# Patient Record
Sex: FEMALE | Race: WHITE | Employment: FULL TIME | URBAN - METROPOLITAN AREA
[De-identification: names, ages, dates, MRNs, and addresses within clinical notes are randomized per-mention and may not be internally consistent; named-entity substitution may affect disease eponyms.]

---

## 2017-06-19 ENCOUNTER — TELEPHONE (OUTPATIENT)
Dept: FAMILY MEDICINE CLINIC | Age: 32
End: 2017-06-19

## 2017-07-31 ENCOUNTER — OFFICE VISIT (OUTPATIENT)
Dept: FAMILY MEDICINE CLINIC | Age: 32
End: 2017-07-31

## 2017-07-31 VITALS
HEIGHT: 65 IN | SYSTOLIC BLOOD PRESSURE: 106 MMHG | DIASTOLIC BLOOD PRESSURE: 68 MMHG | RESPIRATION RATE: 16 BRPM | HEART RATE: 76 BPM | BODY MASS INDEX: 23.19 KG/M2 | WEIGHT: 139.2 LBS | TEMPERATURE: 98.5 F

## 2017-07-31 DIAGNOSIS — Z00.00 HEALTHCARE MAINTENANCE: Primary | ICD-10-CM

## 2017-07-31 DIAGNOSIS — J45.30 MILD PERSISTENT ASTHMA WITHOUT COMPLICATION: ICD-10-CM

## 2017-07-31 PROBLEM — J30.2 SEASONAL ALLERGIES: Status: ACTIVE | Noted: 2017-07-31

## 2017-07-31 PROCEDURE — 99395 PREV VISIT EST AGE 18-39: CPT | Performed by: FAMILY MEDICINE

## 2017-07-31 RX ORDER — FLUTICASONE PROPIONATE 50 MCG
1 SPRAY, SUSPENSION (ML) NASAL DAILY
COMMUNITY

## 2017-07-31 RX ORDER — LORATADINE 10 MG/1
10 TABLET ORAL DAILY
COMMUNITY

## 2017-07-31 RX ORDER — ALBUTEROL SULFATE 90 UG/1
AEROSOL, METERED RESPIRATORY (INHALATION)
Qty: 1 INHALER | Refills: 5 | Status: SHIPPED | OUTPATIENT
Start: 2017-07-31 | End: 2018-08-16 | Stop reason: SDUPTHER

## 2017-07-31 ASSESSMENT — PATIENT HEALTH QUESTIONNAIRE - PHQ9
1. LITTLE INTEREST OR PLEASURE IN DOING THINGS: 0
2. FEELING DOWN, DEPRESSED OR HOPELESS: 0
SUM OF ALL RESPONSES TO PHQ QUESTIONS 1-9: 0
SUM OF ALL RESPONSES TO PHQ9 QUESTIONS 1 & 2: 0

## 2017-08-05 DIAGNOSIS — Z00.00 HEALTHCARE MAINTENANCE: ICD-10-CM

## 2017-08-05 DIAGNOSIS — Z00.00 HEALTHCARE MAINTENANCE: Primary | ICD-10-CM

## 2017-08-12 DIAGNOSIS — Z00.00 HEALTHCARE MAINTENANCE: ICD-10-CM

## 2017-08-12 LAB
ALBUMIN SERPL-MCNC: 4.3 G/DL (ref 3.9–4.9)
ALP BLD-CCNC: 80 U/L (ref 40–130)
ALT SERPL-CCNC: 18 U/L (ref 0–33)
ANION GAP SERPL CALCULATED.3IONS-SCNC: 15 MEQ/L (ref 7–13)
AST SERPL-CCNC: 15 U/L (ref 0–35)
BASOPHILS ABSOLUTE: 0 K/UL (ref 0–0.2)
BASOPHILS RELATIVE PERCENT: 0.6 %
BILIRUB SERPL-MCNC: 0.3 MG/DL (ref 0–1.2)
BUN BLDV-MCNC: 11 MG/DL (ref 6–20)
CALCIUM SERPL-MCNC: 8.8 MG/DL (ref 8.6–10.2)
CHLORIDE BLD-SCNC: 102 MEQ/L (ref 98–107)
CHOLESTEROL, TOTAL: 138 MG/DL (ref 0–199)
CO2: 25 MEQ/L (ref 22–29)
CREAT SERPL-MCNC: 0.44 MG/DL (ref 0.5–0.9)
EOSINOPHILS ABSOLUTE: 0.1 K/UL (ref 0–0.7)
EOSINOPHILS RELATIVE PERCENT: 2 %
GFR AFRICAN AMERICAN: >60
GFR NON-AFRICAN AMERICAN: >60
GLOBULIN: 2.7 G/DL (ref 2.3–3.5)
GLUCOSE BLD-MCNC: 86 MG/DL (ref 74–109)
HCT VFR BLD CALC: 39.5 % (ref 37–47)
HDLC SERPL-MCNC: 48 MG/DL (ref 40–59)
HEMOGLOBIN: 13.4 G/DL (ref 12–16)
LDL CHOLESTEROL CALCULATED: 80 MG/DL (ref 0–129)
LYMPHOCYTES ABSOLUTE: 1.8 K/UL (ref 1–4.8)
LYMPHOCYTES RELATIVE PERCENT: 28.6 %
MCH RBC QN AUTO: 28.1 PG (ref 27–31.3)
MCHC RBC AUTO-ENTMCNC: 33.9 % (ref 33–37)
MCV RBC AUTO: 83 FL (ref 82–100)
MONOCYTES ABSOLUTE: 0.5 K/UL (ref 0.2–0.8)
MONOCYTES RELATIVE PERCENT: 7.2 %
NEUTROPHILS ABSOLUTE: 3.9 K/UL (ref 1.4–6.5)
NEUTROPHILS RELATIVE PERCENT: 61.6 %
PDW BLD-RTO: 12.9 % (ref 11.5–14.5)
PLATELET # BLD: 193 K/UL (ref 130–400)
POTASSIUM SERPL-SCNC: 4.1 MEQ/L (ref 3.5–5.1)
RBC # BLD: 4.76 M/UL (ref 4.2–5.4)
SODIUM BLD-SCNC: 142 MEQ/L (ref 132–144)
TOTAL PROTEIN: 7 G/DL (ref 6.4–8.1)
TRIGL SERPL-MCNC: 50 MG/DL (ref 0–200)
WBC # BLD: 6.4 K/UL (ref 4.8–10.8)

## 2017-08-14 ENCOUNTER — TELEPHONE (OUTPATIENT)
Dept: FAMILY MEDICINE CLINIC | Age: 32
End: 2017-08-14

## 2017-12-20 ENCOUNTER — OFFICE VISIT (OUTPATIENT)
Dept: FAMILY MEDICINE CLINIC | Age: 32
End: 2017-12-20

## 2017-12-20 VITALS
SYSTOLIC BLOOD PRESSURE: 114 MMHG | HEIGHT: 65 IN | DIASTOLIC BLOOD PRESSURE: 68 MMHG | RESPIRATION RATE: 16 BRPM | BODY MASS INDEX: 22.09 KG/M2 | WEIGHT: 132.6 LBS | HEART RATE: 100 BPM | TEMPERATURE: 97 F

## 2017-12-20 DIAGNOSIS — R68.89 FLU-LIKE SYMPTOMS: ICD-10-CM

## 2017-12-20 DIAGNOSIS — B96.89 ACUTE BACTERIAL SINUSITIS: Primary | ICD-10-CM

## 2017-12-20 DIAGNOSIS — J01.90 ACUTE BACTERIAL SINUSITIS: Primary | ICD-10-CM

## 2017-12-20 LAB
INFLUENZA A ANTIBODY: NORMAL
INFLUENZA B ANTIBODY: NORMAL

## 2017-12-20 PROCEDURE — 87804 INFLUENZA ASSAY W/OPTIC: CPT | Performed by: FAMILY MEDICINE

## 2017-12-20 PROCEDURE — 99213 OFFICE O/P EST LOW 20 MIN: CPT | Performed by: FAMILY MEDICINE

## 2017-12-20 RX ORDER — NORGESTIMATE AND ETHINYL ESTRADIOL
KIT
Refills: 3 | COMMUNITY
Start: 2017-11-25

## 2017-12-20 RX ORDER — CEFUROXIME AXETIL 250 MG/1
250 TABLET ORAL 2 TIMES DAILY
Qty: 20 TABLET | Refills: 0 | Status: SHIPPED | OUTPATIENT
Start: 2017-12-20 | End: 2017-12-30

## 2017-12-20 RX ORDER — FLUTICASONE PROPIONATE 50 MCG
1 SPRAY, SUSPENSION (ML) NASAL DAILY
Qty: 1 BOTTLE | Refills: 0 | Status: CANCELLED | OUTPATIENT
Start: 2017-12-20

## 2017-12-20 NOTE — PROGRESS NOTES
Subjective:       Iram Hopkins is a 27 y.o. female who presents for evaluation of sinus pain. Symptoms include: congestion, cough, facial pain, fevers, headaches, nasal congestion, post nasal drip, sinus pressure, sniffing, and chest discomfort . Onset of symptoms was several days ago. Symptoms have been gradually worsening since that time. Patient is a non-smoker. Patient's medications, allergies, past medical, surgical, social and family histories were reviewed and updated as appropriate. Review of Systems  Constitutional: positive for chills, fatigue, fevers, malaise and sweats  Ears, nose, mouth, throat, and face: as noted above  Respiratory: as noted above  Cardiovascular: negative for chest pain, dyspnea, exertional chest pressure/discomfort, lower extremity edema, near-syncope, orthopnea, palpitations and paroxysmal nocturnal dyspnea  Gastrointestinal: negative for abdominal pain, constipation, diarrhea, nausea, reflux symptoms and vomiting  Integument/breast: negative for dryness, pruritus and rash     Objective     /68   Pulse 100   Temp 97 °F (36.1 °C) (Temporal)   Resp 16   Ht 5' 5\" (1.651 m)   Wt 132 lb 9.6 oz (60.1 kg)   LMP 12/19/2017   BMI 22.07 kg/m²   She appears well, in no apparent distress. Alert and oriented times three, pleasant and cooperative. ENT exam reveals - ENT exam normal, no neck nodes  both TM normal without fluid or infection and throat normal without erythema or exudate. Paranasal sinus exam - tenderness over both maxillary. The neck is supple and free of adenopathy or masses, the thyroid is normal without enlargement or nodules. Chest is clear, no wheezing or rales. Normal symmetric air entry throughout both lung fields. No chest wall deformities or tenderness. S1 and S2 normal, no murmurs, clicks, gallops or rubs. Regular rate and rhythm. The abdomen is soft without tenderness, guarding, mass, rebound or organomegaly.  Bowel sounds are normal. No

## 2017-12-20 NOTE — PATIENT INSTRUCTIONS
hot, wet towel or a warm gel pack on your face 3 or 4 times a day for 5 to 10 minutes each time. · Try a decongestant nasal spray like oxymetazoline (Afrin). Do not use it for more than 3 days in a row. Using it for more than 3 days can make your congestion worse. When should you call for help? Call your doctor now or seek immediate medical care if:  ? · You have new or worse swelling or redness in your face or around your eyes. ? · You have a new or higher fever. ? Watch closely for changes in your health, and be sure to contact your doctor if:  ? · You have new or worse facial pain. ? · The mucus from your nose becomes thicker (like pus) or has new blood in it. ? · You are not getting better as expected. Where can you learn more? Go to https://PlasmonixpepicCodenvyeb.MEDEM. org and sign in to your [employer] account. Enter V164 in the Virtual[employer] Software box to learn more about \"Sinusitis: Care Instructions. \"     If you do not have an account, please click on the \"Sign Up Now\" link. Current as of: May 12, 2017  Content Version: 11.4  © 0085-9589 Healthwise, [employer]. Care instructions adapted under license by [facility] [address] If you have questions about a medical condition or this instruction, always ask your healthcare professional. [address] any warranty or liability for your use of this information.

## 2017-12-20 NOTE — LETTER
Kina [facility] PCP  [address] [address]  Phone: [phone]  Fax: [phone]    Ricardo Amaro MD        December 20, 2017     Patient: Yadira Palacios   YOB: 1985   Date of Visit: 12/20/2017       To Whom it May Concern:    Yadira Palacios was seen in my clinic on 12/20/2017. She may return to work on 12/28/17 off work from 12/18/17 to 12/22/17. If you have any questions or concerns, please don't hesitate to call.     Sincerely,         Ricardo Amaro MD

## 2018-08-13 ENCOUNTER — OFFICE VISIT (OUTPATIENT)
Dept: FAMILY MEDICINE CLINIC | Age: 33
End: 2018-08-13
Payer: COMMERCIAL

## 2018-08-13 VITALS
TEMPERATURE: 98.1 F | BODY MASS INDEX: 22.33 KG/M2 | HEART RATE: 84 BPM | RESPIRATION RATE: 16 BRPM | DIASTOLIC BLOOD PRESSURE: 76 MMHG | SYSTOLIC BLOOD PRESSURE: 110 MMHG | WEIGHT: 134 LBS | HEIGHT: 65 IN

## 2018-08-13 DIAGNOSIS — Z00.00 HEALTH CARE MAINTENANCE: Primary | ICD-10-CM

## 2018-08-13 PROCEDURE — 99395 PREV VISIT EST AGE 18-39: CPT | Performed by: FAMILY MEDICINE

## 2018-08-13 ASSESSMENT — PATIENT HEALTH QUESTIONNAIRE - PHQ9
1. LITTLE INTEREST OR PLEASURE IN DOING THINGS: 0
SUM OF ALL RESPONSES TO PHQ QUESTIONS 1-9: 0
SUM OF ALL RESPONSES TO PHQ QUESTIONS 1-9: 0
2. FEELING DOWN, DEPRESSED OR HOPELESS: 0
SUM OF ALL RESPONSES TO PHQ9 QUESTIONS 1 & 2: 0

## 2018-08-13 NOTE — PROGRESS NOTES
shortness of breath or wheezing  Cardiovascular ROS: no chest pain or dyspnea on exertion  Gastrointestinal ROS: no abdominal pain, change in bowel habits, or black or bloody stools  Genito-Urinary ROS: no dysuria, trouble voiding, or hematuria  Musculoskeletal ROS: negative for - joint pain, joint stiffness, joint swelling, muscle pain or muscular weakness  Neurological ROS: negative for - dizziness, gait disturbance, headaches, impaired coordination/balance, numbness/tingling, tremors or visual changes  Dermatological ROS: negative for - dry skin, lumps, pruritus or rash    Blood pressure 110/76, pulse 84, temperature 98.1 °F (36.7 °C), temperature source Temporal, resp. rate 16, height 5' 5\" (1.651 m), weight 134 lb (60.8 kg), last menstrual period 08/08/2018, currently breastfeeding. Physical Examination: General appearance - alert, well appearing, and in no distress  Mental status - alert, oriented to person, place, and time  Eyes - pupils equal and reactive, extraocular eye movements intact  Ears - bilateral TM's and external ear canals normal  Mouth - mucous membranes moist, pharynx normal without lesions  Neck - supple, no significant adenopathy  Lymphatics - no palpable lymphadenopathy, no hepatosplenomegaly  Chest - clear to auscultation, no wheezes, rales or rhonchi, symmetric air entry  Heart - normal rate, regular rhythm, normal S1, S2, no murmurs, rubs, clicks or gallops  Abdomen - soft, nontender, nondistended, no masses or organomegaly  Neurological - alert, oriented, normal speech, no focal findings or movement disorder noted  Musculoskeletal - no joint tenderness, deformity or swelling  Extremities: peripheral pulses normal, no pedal edema, no clubbing or cyanosis. Diagnosis Orders   1.  Health care maintenance  CBC Auto Differential    Comprehensive Metabolic Panel    Lipid Panel         Orders Placed This Encounter   Procedures    CBC Auto Differential     Standing Status:   Future Standing Expiration Date:   8/13/2019    Comprehensive Metabolic Panel     Standing Status:   Future     Standing Expiration Date:   8/13/2019    Lipid Panel     Standing Status:   Future     Standing Expiration Date:   8/13/2019     Order Specific Question:   Is Patient Fasting?/# of Hours     Answer:   10-12       Outpatient Encounter Prescriptions as of 8/13/2018   Medication Sig Dispense Refill    TRI-PREVIFEM 0.18/0.215/0.25 MG-35 MCG TABS TAKE 1 TABLET BY MOUTH DAILY  3    loratadine (CLARITIN) 10 MG tablet Take 10 mg by mouth daily      albuterol sulfate HFA (PROAIR HFA) 108 (90 Base) MCG/ACT inhaler 1-2 puffs every 4-6 hrs as needed for cough/wheezing 1 Inhaler 5    fluticasone (FLONASE) 50 MCG/ACT nasal spray 1 spray by Nasal route daily       No facility-administered encounter medications on file as of 8/13/2018. The nature of cardiac risk has been fully discussed with this patient. I have made her aware of her LDL target goal given her cardiovascular risk analysis. I have discussed the appropriate diet. The need for lifelong compliance in order to reduce risk is stressed. A regular exercise program is recommended to help achieve and maintain normal body weight, fitness and improve lipid balance. A written copy of a low fat, low cholesterol diet has been given to the patient. Patient education provided. They understand and agree with this course of treatment. They will return with new or worsening symptoms. Patient instructed to remain current with appropriate annual health maintenance.         8

## 2018-08-13 NOTE — PATIENT INSTRUCTIONS
exposed skin. · See a dentist one or two times a year for checkups and to have your teeth cleaned. · Wear a seat belt in the car. · Drink alcohol in moderation, if at all. That means no more than 2 drinks a day for men and 1 drink a day for women. Follow your doctor's advice about when to have certain tests. These tests can spot problems early. For everyone  · Cholesterol. Have the fat (cholesterol) in your blood tested after age 21. Your doctor will tell you how often to have this done based on your age, family history, or other things that can increase your risk for heart disease. · Blood pressure. Have your blood pressure checked during a routine doctor visit. Your doctor will tell you how often to check your blood pressure based on your age, your blood pressure results, and other factors. · Vision. Talk with your doctor about how often to have a glaucoma test.  · Diabetes. Ask your doctor whether you should have tests for diabetes. · Colon cancer. Have a test for colon cancer at age 48. You may have one of several tests. If you are younger than 48, you may need a test earlier if you have any risk factors. Risk factors include whether you already had a precancerous polyp removed from your colon or whether your parent, brother, sister, or child has had colon cancer. For women  · Breast exam and mammogram. Talk to your doctor about when you should have a clinical breast exam and a mammogram. Medical experts differ on whether and how often women under 50 should have these tests. Your doctor can help you decide what is right for you. · Pap test and pelvic exam. Begin Pap tests at age 24. A Pap test is the best way to find cervical cancer. The test often is part of a pelvic exam. Ask how often to have this test.  · Tests for sexually transmitted infections (STIs). Ask whether you should have tests for STIs.  You may be at risk if you have sex with more than one person, especially if your partners do not wear condoms. For men  · Tests for sexually transmitted infections (STIs). Ask whether you should have tests for STIs. You may be at risk if you have sex with more than one person, especially if you do not wear a condom. · Testicular cancer exam. Ask your doctor whether you should check your testicles regularly. · Prostate exam. Talk to your doctor about whether you should have a blood test (called a PSA test) for prostate cancer. Experts differ on whether and when men should have this test. Some experts suggest it if you are older than 39 and are -American or have a father or brother who got prostate cancer when he was younger than 72. When should you call for help? Watch closely for changes in your health, and be sure to contact your doctor if you have any problems or symptoms that concern you. Where can you learn more? Go to https://AudioMicropearnaldoeb.healthTPI Composites. org and sign in to your [employer] account. Enter P072 in the tribr box to learn more about \"Well Visit, Ages 25 to 48: Care Instructions. \"     If you do not have an account, please click on the \"Sign Up Now\" link. Current as of: May 16, 2017  Content Version: 11.7  © 1893-6538 [employer]. Care instructions adapted under license by [facility] ([facility]). If you have questions about a medical condition or this instruction, always ask your healthcare professional. [address]  any warranty or liability for your use of this information.

## 2018-08-18 DIAGNOSIS — Z00.00 HEALTH CARE MAINTENANCE: ICD-10-CM

## 2018-08-18 LAB
ALBUMIN SERPL-MCNC: 4.3 G/DL (ref 3.9–4.9)
ALP BLD-CCNC: 59 U/L (ref 40–130)
ALT SERPL-CCNC: 14 U/L (ref 0–33)
ANION GAP SERPL CALCULATED.3IONS-SCNC: 16 MEQ/L (ref 7–13)
AST SERPL-CCNC: 16 U/L (ref 0–35)
BASOPHILS ABSOLUTE: 0 K/UL (ref 0–0.2)
BASOPHILS RELATIVE PERCENT: 0.6 %
BILIRUB SERPL-MCNC: 0.3 MG/DL (ref 0–1.2)
BUN BLDV-MCNC: 12 MG/DL (ref 6–20)
CALCIUM SERPL-MCNC: 8.7 MG/DL (ref 8.6–10.2)
CHLORIDE BLD-SCNC: 102 MEQ/L (ref 98–107)
CHOLESTEROL, TOTAL: 177 MG/DL (ref 0–199)
CO2: 22 MEQ/L (ref 22–29)
CREAT SERPL-MCNC: 0.5 MG/DL (ref 0.5–0.9)
EOSINOPHILS ABSOLUTE: 0.1 K/UL (ref 0–0.7)
EOSINOPHILS RELATIVE PERCENT: 2.1 %
GFR AFRICAN AMERICAN: >60
GFR NON-AFRICAN AMERICAN: >60
GLOBULIN: 2.8 G/DL (ref 2.3–3.5)
GLUCOSE BLD-MCNC: 72 MG/DL (ref 74–109)
HCT VFR BLD CALC: 39.7 % (ref 37–47)
HDLC SERPL-MCNC: 61 MG/DL (ref 40–59)
HEMOGLOBIN: 13.8 G/DL (ref 12–16)
LDL CHOLESTEROL CALCULATED: 101 MG/DL (ref 0–129)
LYMPHOCYTES ABSOLUTE: 2 K/UL (ref 1–4.8)
LYMPHOCYTES RELATIVE PERCENT: 30 %
MCH RBC QN AUTO: 30.1 PG (ref 27–31.3)
MCHC RBC AUTO-ENTMCNC: 34.9 % (ref 33–37)
MCV RBC AUTO: 86.3 FL (ref 82–100)
MONOCYTES ABSOLUTE: 0.4 K/UL (ref 0.2–0.8)
MONOCYTES RELATIVE PERCENT: 5.6 %
NEUTROPHILS ABSOLUTE: 4 K/UL (ref 1.4–6.5)
NEUTROPHILS RELATIVE PERCENT: 61.7 %
PDW BLD-RTO: 12.4 % (ref 11.5–14.5)
PLATELET # BLD: 209 K/UL (ref 130–400)
POTASSIUM SERPL-SCNC: 3.9 MEQ/L (ref 3.5–5.1)
RBC # BLD: 4.6 M/UL (ref 4.2–5.4)
SODIUM BLD-SCNC: 140 MEQ/L (ref 132–144)
TOTAL PROTEIN: 7.1 G/DL (ref 6.4–8.1)
TRIGL SERPL-MCNC: 75 MG/DL (ref 0–200)
WBC # BLD: 6.6 K/UL (ref 4.8–10.8)

## 2018-08-21 NOTE — PROGRESS NOTES
Please call Luisa Palomino to notify her that test results are normal including cholesterol, blood glucose, kidney and liver functions and blood count. Health form completed. Scan, fax and give original to patient.

## 2019-03-14 ENCOUNTER — OFFICE VISIT (OUTPATIENT)
Dept: FAMILY MEDICINE CLINIC | Age: 34
End: 2019-03-14
Payer: COMMERCIAL

## 2019-03-14 VITALS
WEIGHT: 168 LBS | SYSTOLIC BLOOD PRESSURE: 108 MMHG | TEMPERATURE: 97.8 F | DIASTOLIC BLOOD PRESSURE: 68 MMHG | OXYGEN SATURATION: 100 % | RESPIRATION RATE: 14 BRPM | HEART RATE: 70 BPM | BODY MASS INDEX: 27.99 KG/M2 | HEIGHT: 65 IN

## 2019-03-14 DIAGNOSIS — R07.89 CHEST TIGHTNESS: ICD-10-CM

## 2019-03-14 DIAGNOSIS — J06.9 UPPER RESPIRATORY TRACT INFECTION, UNSPECIFIED TYPE: Primary | ICD-10-CM

## 2019-03-14 DIAGNOSIS — R05.9 COUGH: ICD-10-CM

## 2019-03-14 PROCEDURE — 99213 OFFICE O/P EST LOW 20 MIN: CPT | Performed by: NURSE PRACTITIONER

## 2019-03-14 RX ORDER — METHYLPREDNISOLONE 4 MG/1
TABLET ORAL
Qty: 1 KIT | Refills: 0 | Status: SHIPPED | OUTPATIENT
Start: 2019-03-14 | End: 2019-03-20

## 2019-03-14 RX ORDER — IPRATROPIUM BROMIDE 21 UG/1
2 SPRAY, METERED NASAL 3 TIMES DAILY
Qty: 1 BOTTLE | Refills: 3 | Status: SHIPPED | OUTPATIENT
Start: 2019-03-14 | End: 2020-03-13

## 2019-03-14 ASSESSMENT — ENCOUNTER SYMPTOMS
SORE THROAT: 1
SINUS PAIN: 0
SINUS PRESSURE: 0
COUGH: 1
RHINORRHEA: 1
WHEEZING: 0
CHEST TIGHTNESS: 1
GASTROINTESTINAL NEGATIVE: 1

## 2019-03-25 ASSESSMENT — ENCOUNTER SYMPTOMS: EYES NEGATIVE: 1

## 2023-06-03 DIAGNOSIS — R06.2 WHEEZING: ICD-10-CM

## 2023-06-06 DIAGNOSIS — G43.019 MIGRAINE WITHOUT AURA, INTRACTABLE, WITHOUT STATUS MIGRAINOSUS: ICD-10-CM

## 2023-06-07 RX ORDER — SUMATRIPTAN SUCCINATE 100 MG/1
TABLET ORAL
Qty: 10 TABLET | Refills: 2 | Status: SHIPPED | OUTPATIENT
Start: 2023-06-07 | End: 2023-11-04

## 2023-06-12 RX ORDER — ALBUTEROL SULFATE 90 UG/1
AEROSOL, METERED RESPIRATORY (INHALATION)
Qty: 8.5 G | Refills: 2 | Status: SHIPPED | OUTPATIENT
Start: 2023-06-12

## 2023-06-12 NOTE — TELEPHONE ENCOUNTER
Patient is scheduled for 8/3/23  Patient stated that she would like to just do her annual physical and medication refills all at once. She is currently out of town and is not in desperate need of medication   Please advise

## 2023-07-26 PROBLEM — J45.20 MILD INTERMITTENT ASTHMA WITHOUT COMPLICATION (HHS-HCC): Status: ACTIVE | Noted: 2023-07-26

## 2023-07-26 PROBLEM — M25.50 POLYARTHRALGIA: Status: ACTIVE | Noted: 2023-07-26

## 2023-07-26 PROBLEM — M10.072 ACUTE IDIOPATHIC GOUT OF LEFT FOOT: Status: ACTIVE | Noted: 2023-07-26

## 2023-07-26 PROBLEM — G43.019 INTRACTABLE MIGRAINE WITHOUT AURA AND WITHOUT STATUS MIGRAINOSUS: Status: ACTIVE | Noted: 2023-07-26

## 2023-07-26 RX ORDER — NORGESTIMATE AND ETHINYL ESTRADIOL 0.25-0.035
1 KIT ORAL DAILY
COMMUNITY
Start: 2019-08-07 | End: 2023-11-09 | Stop reason: WASHOUT

## 2023-07-26 RX ORDER — MULTIVITAMIN
1 TABLET ORAL DAILY
COMMUNITY

## 2023-07-26 RX ORDER — LORATADINE 10 MG/1
10 TABLET ORAL DAILY
COMMUNITY

## 2023-07-26 RX ORDER — VIT C/ZN GLUC/HERBAL NO.325 90 MG-15MG
1 LOZENGE MUCOUS MEMBRANE DAILY
COMMUNITY

## 2023-08-03 ENCOUNTER — LAB (OUTPATIENT)
Dept: LAB | Facility: LAB | Age: 38
End: 2023-08-03
Payer: COMMERCIAL

## 2023-08-03 ENCOUNTER — OFFICE VISIT (OUTPATIENT)
Dept: PRIMARY CARE | Facility: CLINIC | Age: 38
End: 2023-08-03
Payer: COMMERCIAL

## 2023-08-03 VITALS
SYSTOLIC BLOOD PRESSURE: 102 MMHG | OXYGEN SATURATION: 99 % | TEMPERATURE: 97.6 F | DIASTOLIC BLOOD PRESSURE: 60 MMHG | WEIGHT: 136.4 LBS | RESPIRATION RATE: 17 BRPM | HEIGHT: 65 IN | BODY MASS INDEX: 22.73 KG/M2 | HEART RATE: 71 BPM

## 2023-08-03 DIAGNOSIS — J45.20 MILD INTERMITTENT ASTHMA WITHOUT COMPLICATION (HHS-HCC): ICD-10-CM

## 2023-08-03 DIAGNOSIS — G43.019 INTRACTABLE MIGRAINE WITHOUT AURA AND WITHOUT STATUS MIGRAINOSUS: ICD-10-CM

## 2023-08-03 DIAGNOSIS — Z00.00 HEALTHCARE MAINTENANCE: Primary | ICD-10-CM

## 2023-08-03 DIAGNOSIS — Z00.00 HEALTHCARE MAINTENANCE: ICD-10-CM

## 2023-08-03 LAB
ALANINE AMINOTRANSFERASE (SGPT) (U/L) IN SER/PLAS: 14 U/L (ref 7–45)
ALBUMIN (G/DL) IN SER/PLAS: 4.3 G/DL (ref 3.4–5)
ALKALINE PHOSPHATASE (U/L) IN SER/PLAS: 51 U/L (ref 33–110)
ANION GAP IN SER/PLAS: 14 MMOL/L (ref 10–20)
ASPARTATE AMINOTRANSFERASE (SGOT) (U/L) IN SER/PLAS: 13 U/L (ref 9–39)
BASOPHILS (10*3/UL) IN BLOOD BY AUTOMATED COUNT: 0.06 X10E9/L (ref 0–0.1)
BASOPHILS/100 LEUKOCYTES IN BLOOD BY AUTOMATED COUNT: 1 % (ref 0–2)
BILIRUBIN TOTAL (MG/DL) IN SER/PLAS: 0.5 MG/DL (ref 0–1.2)
CALCIUM (MG/DL) IN SER/PLAS: 9.3 MG/DL (ref 8.6–10.3)
CARBON DIOXIDE, TOTAL (MMOL/L) IN SER/PLAS: 26 MMOL/L (ref 21–32)
CHLORIDE (MMOL/L) IN SER/PLAS: 104 MMOL/L (ref 98–107)
CHOLESTEROL (MG/DL) IN SER/PLAS: 193 MG/DL (ref 0–199)
CHOLESTEROL IN HDL (MG/DL) IN SER/PLAS: 74.4 MG/DL
CHOLESTEROL/HDL RATIO: 2.6
CREATININE (MG/DL) IN SER/PLAS: 0.8 MG/DL (ref 0.5–1.05)
EOSINOPHILS (10*3/UL) IN BLOOD BY AUTOMATED COUNT: 0.2 X10E9/L (ref 0–0.7)
EOSINOPHILS/100 LEUKOCYTES IN BLOOD BY AUTOMATED COUNT: 3.3 % (ref 0–6)
ERYTHROCYTE DISTRIBUTION WIDTH (RATIO) BY AUTOMATED COUNT: 11.9 % (ref 11.5–14.5)
ERYTHROCYTE MEAN CORPUSCULAR HEMOGLOBIN CONCENTRATION (G/DL) BY AUTOMATED: 34.1 G/DL (ref 32–36)
ERYTHROCYTE MEAN CORPUSCULAR VOLUME (FL) BY AUTOMATED COUNT: 86 FL (ref 80–100)
ERYTHROCYTES (10*6/UL) IN BLOOD BY AUTOMATED COUNT: 4.66 X10E12/L (ref 4–5.2)
GFR FEMALE: >90 ML/MIN/1.73M2
GLUCOSE (MG/DL) IN SER/PLAS: 85 MG/DL (ref 74–99)
HEMATOCRIT (%) IN BLOOD BY AUTOMATED COUNT: 40.2 % (ref 36–46)
HEMOGLOBIN (G/DL) IN BLOOD: 13.7 G/DL (ref 12–16)
IMMATURE GRANULOCYTES/100 LEUKOCYTES IN BLOOD BY AUTOMATED COUNT: 0.3 % (ref 0–0.9)
LDL: 101 MG/DL (ref 0–99)
LEUKOCYTES (10*3/UL) IN BLOOD BY AUTOMATED COUNT: 6 X10E9/L (ref 4.4–11.3)
LYMPHOCYTES (10*3/UL) IN BLOOD BY AUTOMATED COUNT: 2.06 X10E9/L (ref 1.2–4.8)
LYMPHOCYTES/100 LEUKOCYTES IN BLOOD BY AUTOMATED COUNT: 34.4 % (ref 13–44)
MONOCYTES (10*3/UL) IN BLOOD BY AUTOMATED COUNT: 0.34 X10E9/L (ref 0.1–1)
MONOCYTES/100 LEUKOCYTES IN BLOOD BY AUTOMATED COUNT: 5.7 % (ref 2–10)
NEUTROPHILS (10*3/UL) IN BLOOD BY AUTOMATED COUNT: 3.31 X10E9/L (ref 1.2–7.7)
NEUTROPHILS/100 LEUKOCYTES IN BLOOD BY AUTOMATED COUNT: 55.3 % (ref 40–80)
PLATELETS (10*3/UL) IN BLOOD AUTOMATED COUNT: 222 X10E9/L (ref 150–450)
POTASSIUM (MMOL/L) IN SER/PLAS: 4.1 MMOL/L (ref 3.5–5.3)
PROTEIN TOTAL: 7.4 G/DL (ref 6.4–8.2)
SODIUM (MMOL/L) IN SER/PLAS: 140 MMOL/L (ref 136–145)
TRIGLYCERIDE (MG/DL) IN SER/PLAS: 87 MG/DL (ref 0–149)
UREA NITROGEN (MG/DL) IN SER/PLAS: 14 MG/DL (ref 6–23)
VLDL: 17 MG/DL (ref 0–40)

## 2023-08-03 PROCEDURE — 1036F TOBACCO NON-USER: CPT | Performed by: FAMILY MEDICINE

## 2023-08-03 PROCEDURE — 36415 COLL VENOUS BLD VENIPUNCTURE: CPT

## 2023-08-03 PROCEDURE — 85025 COMPLETE CBC W/AUTO DIFF WBC: CPT

## 2023-08-03 PROCEDURE — 80061 LIPID PANEL: CPT

## 2023-08-03 PROCEDURE — 99396 PREV VISIT EST AGE 40-64: CPT | Performed by: FAMILY MEDICINE

## 2023-08-03 PROCEDURE — 80053 COMPREHEN METABOLIC PANEL: CPT

## 2023-08-03 RX ORDER — TOPIRAMATE 50 MG/1
50 TABLET, FILM COATED ORAL 2 TIMES DAILY
Qty: 60 TABLET | Refills: 2 | Status: SHIPPED | OUTPATIENT
Start: 2023-08-03 | End: 2023-11-09 | Stop reason: SDUPTHER

## 2023-08-03 RX ORDER — DROSPIRENONE AND ETHINYL ESTRADIOL 0.03MG-3MG
1 KIT ORAL DAILY
COMMUNITY

## 2023-08-03 RX ORDER — TOPIRAMATE 25 MG/1
TABLET ORAL
Qty: 70 TABLET | Refills: 0 | Status: SHIPPED | OUTPATIENT
Start: 2023-08-03 | End: 2023-11-09 | Stop reason: DRUGHIGH

## 2023-08-03 ASSESSMENT — PATIENT HEALTH QUESTIONNAIRE - PHQ9
SUM OF ALL RESPONSES TO PHQ9 QUESTIONS 1 AND 2: 0
1. LITTLE INTEREST OR PLEASURE IN DOING THINGS: NOT AT ALL
2. FEELING DOWN, DEPRESSED OR HOPELESS: NOT AT ALL

## 2023-08-03 NOTE — PROGRESS NOTES
Chief Complaint   Patient presents with    Annual Exam      She presents for annual physical exam and other chronic medical conditions.    Patient has a waist measurement of 78 cm.    Headache  Patient presents for evaluation of headache. Generally, the headaches last about several hours and occur monthly. The headaches  typically occur around menstrual cycle . The headaches are usually pounding, sharp, and throbbing. Recently, the headaches have been increasing in frequency. Work attendance or other daily activities are not affected by the headaches. Precipitating factors include: menses. The headaches are usually not preceded by an aura. The patient denies depression, dizziness, loss of balance, numbness of extremities, speech difficulties, and vision problems. Home treatment has included Imitrex oral with some improvement.    Patient Active Problem List   Diagnosis    Intractable migraine without aura and without status migrainosus    Mild intermittent asthma without complication    Polyarthralgia    Acute idiopathic gout of left foot    Healthcare maintenance     has a current medication list which includes the following prescription(s): albuterol, drospirenone-ethinyl estradiol, loratadine, multivitamin with folic acid, sumatriptan, elderberry zinc vit c, and norgestimate-ethinyl estradiol.    History reviewed. No pertinent past medical history.    Past Surgical History:   Procedure Laterality Date    OTHER SURGICAL HISTORY  08/15/2019    [place] tooth extraction     family history is not on file.    No Known Allergies    Review of Systems -   General ROS: negative for - fatigue, fever, malaise, night sweats or weight loss  Eyes ROS no blurred vision, no double vision, no eye discharge and no eye redness.  ENT ROS: negative for - hearing change, nasal discharge, oral lesions, sinus pain, sore throat, tinnitus or vertigo  Respiratory ROS: negative for - cough, hemoptysis, pleuritic pain, shortness of breath or  "wheezing  Cardiovascular ROS: no chest pain or dyspnea on exertion, palpitations, PND or orthopnea.  No syncope.  Gastrointestinal ROS: no abdominal pain, change in bowel habits, or black or bloody stools  Genito-Urinary ROS: no dysuria, trouble voiding, or hematuria  Dermatological ROS: negative for - dry skin, lumps, pruritus or rash  Musculoskeletal ROS: negative for - joint pain, joint stiffness, joint swelling, muscle pain or muscular weakness  Psychological ROS: negative for - anxiety, concentration difficulties, depression, memory difficulties or sleep disturbances  Endocrine ROS: negative for - hot flashes, malaise/lethargy, palpitations, polydipsia/polyuria, skin changes, temperature intolerance   Hematological and Lymphatic ROS: negative for - bruising, night sweats or pallor    Blood pressure 102/60, pulse 71, temperature 36.4 °C (97.6 °F), resp. rate 17, height 1.651 m (5' 5\"), weight 61.9 kg (136 lb 6.4 oz), SpO2 99 %.    Physical Examination:   General appearance - alert, well appearing, and in no distress  HEENT EOMI, PEERLA, normal eyelids.  Oropharynx no erythema or exudate.  Normal tonsils.    Ears -external auditory canal normal bilaterally.  Tympanic membranes normal bilaterally.  Mouth - mucous membranes moist, pharynx normal without lesions  Neck - supple, trachea central no thyromegaly.  No significant adenopathy  Chest -good air entry bilaterally, no rhonchi rales and no wheezes.  Heart -Normal S1 and S2, regular rate and rhythm with no murmurs gallop or rub.  Abdomen -Non distended, soft, nontender with no guarding, no palpable mass and no CVA tenderness.  Bowel sounds normal.  No hernia.  Skin no rash, nonicteric and warm to touch.  Neurological - alert, oriented, cranial nerves II through XII normal.  Reflexes 2+ bilaterally.  No focal deficit.  Musculoskeletal - no joint tenderness, deformity or swelling  Extremities: peripheral pulses normal, no clubbing or cyanosis.    Problem List " Items Addressed This Visit       Intractable migraine without aura and without status migrainosus    Current Assessment & Plan       Lie in darkened room and apply cold packs as needed for pain.  Side effect profile discussed in detail.  Asked to keep headache diary.  Patient reassured that neurodiagnostic workup not indicated from benign H&P.  Will start on Topamax 25mg increasing dose up to 50mg twice daily and recheck in 3 months to see if this plan is alleviating the migraines.            Mild intermittent asthma without complication    Current Assessment & Plan     Asthma Plan:  Discussed distinction between quick-relief and controlled medications.  Warning signs of respiratory distress were reviewed with the patient.   Discussed avoidance of precipitants.  Discussed pathophysiology of asthma.  Patient to keep asthma diary.          Healthcare maintenance - Primary    Relevant Orders    CBC and Auto Differential    Comprehensive metabolic panel    Lipid panel      Outpatient Encounter Medications as of 8/3/2023   Medication Sig Dispense Refill    albuterol 90 mcg/actuation inhaler INHALE 1 (ONE) to 2 (TWO) puffs BY MOUTH EVERY 4 TO 6 HOURS AS NEEDED for cough or wheezing 8.5 g 2    drospirenone-ethinyl estradioL (Rita, Ocella) 3-0.03 mg tablet Take 1 tablet by mouth once daily.      loratadine (Claritin) 10 mg tablet Take 1 tablet (10 mg) by mouth once daily.      multivitamin with folic acid (One Daily Multivitamin) 400 mcg tablet Take 1 tablet by mouth once daily.      SUMAtriptan (Imitrex) 100 mg tablet TAKE 1 TABLET BY MOUTH AT ONSET OF MIGRAINE HEADACHE. MAY REPEAT IN 2 HOURS AS NEEDED 10 tablet 2    vit C-Zn gluc-herbal no.325 (Elderberry Zinc Vit C) 90-15 mg lozenge Take 1 lozenge by mouth once daily.      norgestimate-ethinyl estradioL (Mono-Linyah) 0.25-35 mg-mcg tablet Take 1 tablet by mouth once daily.       No facility-administered encounter medications on file as of 8/3/2023.     The nature of  cardiac risk has been fully discussed with this patient. I have made  aware of  LDL target goal given  cardiovascular risk analysis. I have discussed the appropriate diet. The need for lifelong compliance in order to reduce risk is stressed. A regular exercise program is recommended to help achieve and maintain normal body weight, fitness and improve lipid balance. A written copy of a low fat, low cholesterol diet has been given to the patient.    Patient education provided. They understand and agree with this course of treatment. They will return with new or worsening symptoms. Patient instructed to remain current with appropriate annual health maintenance.     Scribe Attestation  By signing my name below, IMayela Scribe   attest that this documentation has been prepared under the direction and in the presence of Denis Man MD.

## 2023-08-03 NOTE — ASSESSMENT & PLAN NOTE
Asthma Plan:  Discussed distinction between quick-relief and controlled medications.  Warning signs of respiratory distress were reviewed with the patient.   Discussed avoidance of precipitants.  Discussed pathophysiology of asthma.  Patient to keep asthma diary.

## 2023-08-03 NOTE — ASSESSMENT & PLAN NOTE
Lie in darkened room and apply cold packs as needed for pain.  Side effect profile discussed in detail.  Asked to keep headache diary.  Patient reassured that neurodiagnostic workup not indicated from benign H&P.  Will start on Topamax 25mg increasing dose up to 50mg twice daily and recheck in 3 months to see if this plan is alleviating the migraines.

## 2023-09-08 DIAGNOSIS — G43.019 INTRACTABLE MIGRAINE WITHOUT AURA AND WITHOUT STATUS MIGRAINOSUS: ICD-10-CM

## 2023-09-08 RX ORDER — TOPIRAMATE 50 MG/1
50 TABLET, FILM COATED ORAL 2 TIMES DAILY
Qty: 60 TABLET | Refills: 2 | OUTPATIENT
Start: 2023-09-08

## 2023-09-08 NOTE — TELEPHONE ENCOUNTER
Duplicate -- Deny request     Rx Refill Request --- Last Rx 23 with 2 refills     Name: Shahida Mason  :  1985     Specific Pharmacy location:  [facility]   Date of last appointment:  8/3/2023   Date of next appointment:  2023   Best number to reach patient:  [phone]

## 2023-09-22 ENCOUNTER — APPOINTMENT (OUTPATIENT)
Dept: PRIMARY CARE | Facility: CLINIC | Age: 38
End: 2023-09-22
Payer: COMMERCIAL

## 2023-11-04 DIAGNOSIS — G43.019 MIGRAINE WITHOUT AURA, INTRACTABLE, WITHOUT STATUS MIGRAINOSUS: ICD-10-CM

## 2023-11-04 RX ORDER — SUMATRIPTAN SUCCINATE 100 MG/1
TABLET ORAL
Qty: 10 TABLET | Refills: 2 | Status: SHIPPED | OUTPATIENT
Start: 2023-11-04 | End: 2023-11-09 | Stop reason: SDUPTHER

## 2023-11-09 ENCOUNTER — OFFICE VISIT (OUTPATIENT)
Dept: PRIMARY CARE | Facility: CLINIC | Age: 38
End: 2023-11-09
Payer: COMMERCIAL

## 2023-11-09 VITALS
DIASTOLIC BLOOD PRESSURE: 58 MMHG | HEART RATE: 75 BPM | BODY MASS INDEX: 21.86 KG/M2 | SYSTOLIC BLOOD PRESSURE: 100 MMHG | RESPIRATION RATE: 17 BRPM | OXYGEN SATURATION: 100 % | HEIGHT: 65 IN | TEMPERATURE: 97.4 F | WEIGHT: 131.2 LBS

## 2023-11-09 DIAGNOSIS — G43.019 INTRACTABLE MIGRAINE WITHOUT AURA AND WITHOUT STATUS MIGRAINOSUS: Primary | ICD-10-CM

## 2023-11-09 PROBLEM — J30.9 CHRONIC ALLERGIC RHINITIS: Status: ACTIVE | Noted: 2023-11-09

## 2023-11-09 PROCEDURE — 1036F TOBACCO NON-USER: CPT | Performed by: FAMILY MEDICINE

## 2023-11-09 PROCEDURE — 99213 OFFICE O/P EST LOW 20 MIN: CPT | Performed by: FAMILY MEDICINE

## 2023-11-09 RX ORDER — SUMATRIPTAN SUCCINATE 100 MG/1
TABLET ORAL
Qty: 10 TABLET | Refills: 3 | Status: SHIPPED | OUTPATIENT
Start: 2023-11-09 | End: 2024-02-29 | Stop reason: SDUPTHER

## 2023-11-09 RX ORDER — TOPIRAMATE 50 MG/1
TABLET, FILM COATED ORAL
Qty: 75 TABLET | Refills: 3 | Status: SHIPPED | OUTPATIENT
Start: 2023-11-09 | End: 2024-02-29 | Stop reason: SDUPTHER

## 2023-11-09 ASSESSMENT — ENCOUNTER SYMPTOMS
COUGH: 0
TREMORS: 0
HEMATURIA: 0
VOMITING: 0
PALPITATIONS: 0
CONSTIPATION: 0
SPEECH DIFFICULTY: 0
WHEEZING: 0
SHORTNESS OF BREATH: 0
HEADACHES: 0
FEVER: 0
DYSURIA: 0
WEAKNESS: 0
LOSS OF BALANCE: 0
SORE THROAT: 0
CHILLS: 0
NAUSEA: 1
EYE PAIN: 0
NECK PAIN: 0
NUMBNESS: 0
DIARRHEA: 0
ABDOMINAL PAIN: 0
DIZZINESS: 0
FREQUENCY: 0
SCALP TENDERNESS: 0
RHINORRHEA: 0

## 2023-11-09 NOTE — PROGRESS NOTES
"Subjective   Patient ID: Shahida Mason is a 38 y.o. female who presents for Follow-up (Migraines ).    Migraine   This is a chronic problem. The current episode started more than 1 year ago. The problem occurs monthly. The problem has been unchanged. The quality of the pain is described as squeezing, shooting and throbbing. The pain is at a severity of 8/10. The pain is severe. Associated symptoms include nausea. Pertinent negatives include no abdominal pain, coughing, dizziness, ear pain, eye pain, fever, hearing loss, loss of balance, neck pain, numbness, rhinorrhea, scalp tenderness, sore throat, vomiting or weakness. The symptoms are aggravated by menstrual cycle.        Review of Systems   Constitutional:  Negative for chills and fever.   HENT:  Negative for congestion, ear pain, hearing loss, nosebleeds, rhinorrhea and sore throat.    Eyes:  Negative for pain.   Respiratory:  Negative for cough, shortness of breath and wheezing.    Cardiovascular:  Negative for chest pain, palpitations and leg swelling.   Gastrointestinal:  Positive for nausea. Negative for abdominal pain, constipation, diarrhea and vomiting.   Genitourinary:  Negative for dysuria, frequency and hematuria.   Musculoskeletal:  Negative for neck pain.   Neurological:  Negative for dizziness, tremors, speech difficulty, weakness, numbness, headaches and loss of balance.       Objective   /58 (BP Location: Left arm)   Pulse 75   Temp 36.3 °C (97.4 °F)   Resp 17   Ht 1.651 m (5' 5\")   Wt 59.5 kg (131 lb 3.2 oz)   SpO2 100%   BMI 21.83 kg/m²     Physical Exam  Constitutional:       General: She is not in acute distress.     Appearance: Normal appearance.   HENT:      Head: Normocephalic and atraumatic.      Mouth/Throat:      Mouth: Mucous membranes are moist.      Pharynx: Oropharynx is clear. No oropharyngeal exudate or posterior oropharyngeal erythema.   Eyes:      General: No scleral icterus.     Extraocular Movements: " Extraocular movements intact.      Pupils: Pupils are equal, round, and reactive to light.   Cardiovascular:      Rate and Rhythm: Normal rate and regular rhythm.      Pulses: Normal pulses.      Heart sounds: No murmur heard.     No friction rub. No gallop.   Pulmonary:      Effort: Pulmonary effort is normal.      Breath sounds: No wheezing, rhonchi or rales.   Skin:     General: Skin is warm.      Coloration: Skin is not jaundiced or pale.   Neurological:      General: No focal deficit present.      Mental Status: She is alert and oriented to person, place, and time.         Assessment/Plan   Problem List Items Addressed This Visit       Intractable migraine without aura and without status migrainosus - Primary     We discussed that the hormones from birth control pills combined with the normal hormones can make migraines worse. We discussed other treatment options including monthly injections. The risks and benefits of my recommendations were discussed with the patient today. We will increase the Topamax to 1.5 tablets at night and continue with 1 tablet in the morning and see if the dose adjustment helps before making any additional changes.            Relevant Medications    topiramate (Topamax) 50 mg tablet    SUMAtriptan (Imitrex) 100 mg tablet    Other Relevant Orders    Follow Up In [facility] - PCP - Established     Scribe Attestation  By signing my name below, IChas Scribe   attest that this documentation has been prepared under the direction and in the presence of Denis Man MD.

## 2023-11-09 NOTE — ASSESSMENT & PLAN NOTE
We discussed that the hormones from birth control pills combined with the normal hormones can make migraines worse. We discussed other treatment options including monthly injections. The risks and benefits of my recommendations were discussed with the patient today. We will increase the Topamax to 1.5 tablets at night and continue with 1 tablet in the morning and see if the dose adjustment helps before making any additional changes.

## 2023-12-08 DIAGNOSIS — G43.019 INTRACTABLE MIGRAINE WITHOUT AURA AND WITHOUT STATUS MIGRAINOSUS: ICD-10-CM

## 2023-12-09 RX ORDER — TOPIRAMATE 50 MG/1
TABLET, FILM COATED ORAL
Qty: 75 TABLET | Refills: 3 | OUTPATIENT
Start: 2023-12-09

## 2024-02-02 ENCOUNTER — APPOINTMENT (OUTPATIENT)
Dept: PRIMARY CARE | Facility: CLINIC | Age: 39
End: 2024-02-02
Payer: COMMERCIAL

## 2024-02-12 ENCOUNTER — APPOINTMENT (OUTPATIENT)
Dept: PRIMARY CARE | Facility: CLINIC | Age: 39
End: 2024-02-12
Payer: COMMERCIAL

## 2024-02-29 ENCOUNTER — OFFICE VISIT (OUTPATIENT)
Dept: PRIMARY CARE | Facility: CLINIC | Age: 39
End: 2024-02-29
Payer: COMMERCIAL

## 2024-02-29 VITALS
BODY MASS INDEX: 21.99 KG/M2 | DIASTOLIC BLOOD PRESSURE: 60 MMHG | HEIGHT: 65 IN | RESPIRATION RATE: 16 BRPM | HEART RATE: 72 BPM | SYSTOLIC BLOOD PRESSURE: 104 MMHG | WEIGHT: 132 LBS | TEMPERATURE: 97.4 F

## 2024-02-29 DIAGNOSIS — J45.20 MILD INTERMITTENT ASTHMA WITHOUT COMPLICATION (HHS-HCC): ICD-10-CM

## 2024-02-29 DIAGNOSIS — G43.019 INTRACTABLE MIGRAINE WITHOUT AURA AND WITHOUT STATUS MIGRAINOSUS: Primary | ICD-10-CM

## 2024-02-29 PROCEDURE — 1036F TOBACCO NON-USER: CPT | Performed by: FAMILY MEDICINE

## 2024-02-29 PROCEDURE — 99213 OFFICE O/P EST LOW 20 MIN: CPT | Performed by: FAMILY MEDICINE

## 2024-02-29 RX ORDER — TOPIRAMATE 50 MG/1
TABLET, FILM COATED ORAL
Qty: 75 TABLET | Refills: 5 | Status: SHIPPED | OUTPATIENT
Start: 2024-02-29

## 2024-02-29 RX ORDER — SUMATRIPTAN SUCCINATE 100 MG/1
TABLET ORAL
Qty: 10 TABLET | Refills: 5 | Status: SHIPPED | OUTPATIENT
Start: 2024-02-29

## 2024-02-29 ASSESSMENT — ENCOUNTER SYMPTOMS
FREQUENCY: 0
LOSS OF BALANCE: 0
NUMBNESS: 0
CHILLS: 0
VISUAL CHANGE: 0
FEVER: 0
DIARRHEA: 0
SHORTNESS OF BREATH: 0
WHEEZING: 0
RHINORRHEA: 0
DIZZINESS: 0
ABDOMINAL PAIN: 0
SORE THROAT: 0
BLURRED VISION: 0
VOMITING: 0
PALPITATIONS: 0
DYSURIA: 0
HEMATURIA: 0
WEAKNESS: 0
COUGH: 0
CONSTIPATION: 0
HEADACHES: 1
TREMORS: 0

## 2024-02-29 NOTE — PROGRESS NOTES
"Chief Complaint   Patient presents with    Follow-up     Migraines       Subjective   Patient ID: Shahida Mason is a 38 y.o. female who presents for Follow-up (Migraines).    Migraine   This is a chronic problem. The current episode started more than 1 year ago. The problem occurs intermittently. The problem has been gradually improving. The pain is located in the Bilateral, temporal and frontal region. The quality of the pain is described as throbbing. Pertinent negatives include no abdominal pain, blurred vision, coughing, dizziness, ear pain, fever, hearing loss, loss of balance, numbness, rhinorrhea, sore throat, visual change, vomiting or weakness. The symptoms are aggravated by menstrual cycle and weather changes. Treatments tried: Topamax and Imitrex with improvement.        Review of Systems   Constitutional:  Negative for chills and fever.   HENT:  Negative for congestion, ear pain, hearing loss, nosebleeds, rhinorrhea and sore throat.    Eyes:  Negative for blurred vision.   Respiratory:  Negative for cough, shortness of breath and wheezing.    Cardiovascular:  Negative for chest pain, palpitations and leg swelling.   Gastrointestinal:  Negative for abdominal pain, constipation, diarrhea and vomiting.   Genitourinary:  Negative for dysuria, frequency and hematuria.   Neurological:  Positive for headaches. Negative for dizziness, tremors, weakness, numbness and loss of balance.       Objective   /60   Pulse 72   Temp 36.3 °C (97.4 °F)   Resp 16   Ht 1.651 m (5' 5\")   Wt 59.9 kg (132 lb)   BMI 21.97 kg/m²     Physical Exam  Constitutional:       General: She is not in acute distress.     Appearance: Normal appearance.   HENT:      Head: Normocephalic and atraumatic.      Mouth/Throat:      Mouth: Mucous membranes are moist.      Pharynx: Oropharynx is clear. No oropharyngeal exudate or posterior oropharyngeal erythema.   Eyes:      General: No scleral icterus.     Extraocular Movements: " Extraocular movements intact.      Pupils: Pupils are equal, round, and reactive to light.   Cardiovascular:      Rate and Rhythm: Normal rate and regular rhythm.      Pulses: Normal pulses.      Heart sounds: No murmur heard.     No friction rub. No gallop.   Pulmonary:      Effort: Pulmonary effort is normal.      Breath sounds: No wheezing, rhonchi or rales.   Skin:     General: Skin is warm.      Coloration: Skin is not jaundiced or pale.      Findings: No erythema or rash.   Neurological:      General: No focal deficit present.      Mental Status: She is alert and oriented to person, place, and time.      Cranial Nerves: No cranial nerve deficit.      Sensory: No sensory deficit.      Coordination: Coordination normal.      Gait: Gait normal.         Assessment/Plan   Problem List Items Addressed This Visit       Intractable migraine without aura and without status migrainosus - Primary     Well-controlled, continue current medications and management.         Relevant Medications    topiramate (Topamax) 50 mg tablet    SUMAtriptan (Imitrex) 100 mg tablet    Other Relevant Orders    Follow Up In [facility] - PCP - Established    Mild intermittent asthma without complication     Chronic Condition Documentation : Stable based on symptoms and exam.  Continue established treatment plan and follow-up at least yearly.            Scribe Attestation  By signing my name below, IChas Scribe   attest that this documentation has been prepared under the direction and in the presence of Denis Man MD.

## 2024-03-06 ENCOUNTER — APPOINTMENT (OUTPATIENT)
Dept: PRIMARY CARE | Facility: CLINIC | Age: 39
End: 2024-03-06
Payer: COMMERCIAL

## 2024-05-22 DIAGNOSIS — G43.019 INTRACTABLE MIGRAINE WITHOUT AURA AND WITHOUT STATUS MIGRAINOSUS: ICD-10-CM

## 2024-05-22 RX ORDER — TOPIRAMATE 50 MG/1
TABLET, FILM COATED ORAL
Qty: 75 TABLET | Refills: 5 | OUTPATIENT
Start: 2024-05-22

## 2024-05-22 NOTE — TELEPHONE ENCOUNTER
Last Disp:2024     Name: Shahida C Marlon  :  1985     Date of last appointment:  2024   Date of next appointment:  2024   Best number to reach patient:  [phone]

## 2024-07-19 ENCOUNTER — TELEPHONE (OUTPATIENT)
Dept: PRIMARY CARE | Facility: CLINIC | Age: 39
End: 2024-07-19
Payer: COMMERCIAL

## 2024-07-19 NOTE — TELEPHONE ENCOUNTER
Appointment Request From: Shahida Mason     With Provider: Denis Man [[facility]]     Preferred Date Range: 7/18/2024 - 8/2/2024     Preferred Times: Any Time     Reason for visit: Request an Appointment     Comments:  Can I please have an appointment before my scheduled appointment at the end of August? I’m experiencing migraines more frequently as well as anxiety.  ----------------------------------------------  Please advise

## 2024-07-23 NOTE — TELEPHONE ENCOUNTER
Patient was called 2x back to back to schedule my appointment but phone goes straight to voicemail

## 2024-08-29 ENCOUNTER — LAB (OUTPATIENT)
Dept: LAB | Facility: LAB | Age: 39
End: 2024-08-29
Payer: COMMERCIAL

## 2024-08-29 ENCOUNTER — APPOINTMENT (OUTPATIENT)
Dept: PRIMARY CARE | Facility: CLINIC | Age: 39
End: 2024-08-29
Payer: COMMERCIAL

## 2024-08-29 VITALS
DIASTOLIC BLOOD PRESSURE: 62 MMHG | HEIGHT: 65 IN | RESPIRATION RATE: 16 BRPM | TEMPERATURE: 98 F | BODY MASS INDEX: 21.66 KG/M2 | SYSTOLIC BLOOD PRESSURE: 106 MMHG | WEIGHT: 130 LBS | HEART RATE: 80 BPM

## 2024-08-29 DIAGNOSIS — Z00.00 HEALTHCARE MAINTENANCE: Primary | ICD-10-CM

## 2024-08-29 DIAGNOSIS — Z00.00 HEALTHCARE MAINTENANCE: ICD-10-CM

## 2024-08-29 DIAGNOSIS — G43.019 INTRACTABLE MIGRAINE WITHOUT AURA AND WITHOUT STATUS MIGRAINOSUS: ICD-10-CM

## 2024-08-29 DIAGNOSIS — F32.81 PMDD (PREMENSTRUAL DYSPHORIC DISORDER): ICD-10-CM

## 2024-08-29 DIAGNOSIS — F41.1 GENERALIZED ANXIETY DISORDER: ICD-10-CM

## 2024-08-29 LAB
ALBUMIN SERPL BCP-MCNC: 4 G/DL (ref 3.4–5)
ALP SERPL-CCNC: 52 U/L (ref 33–110)
ALT SERPL W P-5'-P-CCNC: 11 U/L (ref 7–45)
ANION GAP SERPL CALC-SCNC: 11 MMOL/L (ref 10–20)
AST SERPL W P-5'-P-CCNC: 11 U/L (ref 9–39)
BASOPHILS # BLD AUTO: 0.05 X10*3/UL (ref 0–0.1)
BASOPHILS NFR BLD AUTO: 0.9 %
BILIRUB SERPL-MCNC: 0.5 MG/DL (ref 0–1.2)
BUN SERPL-MCNC: 18 MG/DL (ref 6–23)
CALCIUM SERPL-MCNC: 9 MG/DL (ref 8.6–10.3)
CHLORIDE SERPL-SCNC: 108 MMOL/L (ref 98–107)
CHOLEST SERPL-MCNC: 188 MG/DL (ref 0–199)
CHOLESTEROL/HDL RATIO: 2.9
CO2 SERPL-SCNC: 24 MMOL/L (ref 21–32)
CREAT SERPL-MCNC: 0.82 MG/DL (ref 0.5–1.05)
EGFRCR SERPLBLD CKD-EPI 2021: >90 ML/MIN/1.73M*2
EOSINOPHIL # BLD AUTO: 0.22 X10*3/UL (ref 0–0.7)
EOSINOPHIL NFR BLD AUTO: 4 %
ERYTHROCYTE [DISTWIDTH] IN BLOOD BY AUTOMATED COUNT: 11.9 % (ref 11.5–14.5)
GLUCOSE SERPL-MCNC: 75 MG/DL (ref 74–99)
HCT VFR BLD AUTO: 41.4 % (ref 36–46)
HDLC SERPL-MCNC: 65.8 MG/DL
HGB BLD-MCNC: 13.8 G/DL (ref 12–16)
IMM GRANULOCYTES # BLD AUTO: 0.01 X10*3/UL (ref 0–0.7)
IMM GRANULOCYTES NFR BLD AUTO: 0.2 % (ref 0–0.9)
LDLC SERPL CALC-MCNC: 104 MG/DL
LYMPHOCYTES # BLD AUTO: 1.97 X10*3/UL (ref 1.2–4.8)
LYMPHOCYTES NFR BLD AUTO: 35.8 %
MCH RBC QN AUTO: 29.1 PG (ref 26–34)
MCHC RBC AUTO-ENTMCNC: 33.3 G/DL (ref 32–36)
MCV RBC AUTO: 87 FL (ref 80–100)
MONOCYTES # BLD AUTO: 0.31 X10*3/UL (ref 0.1–1)
MONOCYTES NFR BLD AUTO: 5.6 %
NEUTROPHILS # BLD AUTO: 2.94 X10*3/UL (ref 1.2–7.7)
NEUTROPHILS NFR BLD AUTO: 53.5 %
NON HDL CHOLESTEROL: 122 MG/DL (ref 0–149)
NRBC BLD-RTO: 0 /100 WBCS (ref 0–0)
PLATELET # BLD AUTO: 217 X10*3/UL (ref 150–450)
POTASSIUM SERPL-SCNC: 4.2 MMOL/L (ref 3.5–5.3)
PROT SERPL-MCNC: 6.9 G/DL (ref 6.4–8.2)
RBC # BLD AUTO: 4.74 X10*6/UL (ref 4–5.2)
SODIUM SERPL-SCNC: 139 MMOL/L (ref 136–145)
TRIGL SERPL-MCNC: 93 MG/DL (ref 0–149)
VLDL: 19 MG/DL (ref 0–40)
WBC # BLD AUTO: 5.5 X10*3/UL (ref 4.4–11.3)

## 2024-08-29 PROCEDURE — 3008F BODY MASS INDEX DOCD: CPT | Performed by: FAMILY MEDICINE

## 2024-08-29 PROCEDURE — 80061 LIPID PANEL: CPT

## 2024-08-29 PROCEDURE — 80053 COMPREHEN METABOLIC PANEL: CPT

## 2024-08-29 PROCEDURE — 85025 COMPLETE CBC W/AUTO DIFF WBC: CPT

## 2024-08-29 PROCEDURE — 99396 PREV VISIT EST AGE 40-64: CPT | Performed by: FAMILY MEDICINE

## 2024-08-29 PROCEDURE — 36415 COLL VENOUS BLD VENIPUNCTURE: CPT

## 2024-08-29 PROCEDURE — 1036F TOBACCO NON-USER: CPT | Performed by: FAMILY MEDICINE

## 2024-08-29 RX ORDER — FLUOXETINE HYDROCHLORIDE 20 MG/1
20 CAPSULE ORAL DAILY
Qty: 30 CAPSULE | Refills: 2 | Status: SHIPPED | OUTPATIENT
Start: 2024-08-29

## 2024-08-29 RX ORDER — SUMATRIPTAN SUCCINATE 100 MG/1
TABLET ORAL
Qty: 10 TABLET | Refills: 5 | Status: SHIPPED | OUTPATIENT
Start: 2024-08-29

## 2024-08-29 RX ORDER — TOPIRAMATE 50 MG/1
TABLET, FILM COATED ORAL
Qty: 75 TABLET | Refills: 5 | Status: SHIPPED | OUTPATIENT
Start: 2024-08-29

## 2024-08-29 ASSESSMENT — ENCOUNTER SYMPTOMS
NERVOUS/ANXIOUS: 1
SEIZURES: 0
DYSURIA: 0
DIZZINESS: 1
VISUAL CHANGE: 0
HEMATURIA: 0
LOSS OF BALANCE: 0
MUSCLE TENSION: 1
NAUSEA: 1
PALPITATIONS: 0
NECK PAIN: 0
HEADACHES: 0
DIARRHEA: 0
TREMORS: 0
FEVER: 0
CONSTIPATION: 0
ABDOMINAL PAIN: 0
CHILLS: 0
SHORTNESS OF BREATH: 1
INSOMNIA: 0
BLURRED VISION: 0
RESTLESSNESS: 1
FREQUENCY: 0
VOMITING: 0
HYPERVENTILATION: 0
WHEEZING: 0
WEAKNESS: 0
NUMBNESS: 0
PHOTOPHOBIA: 0
SORE THROAT: 0
IRRITABILITY: 1
RHINORRHEA: 0
COUGH: 0

## 2024-08-29 NOTE — ASSESSMENT & PLAN NOTE
Start Fluoxetine 20 mg daily. The risks and benefits of my recommendations, as well as other treatment options were discussed with the patient today.

## 2024-08-29 NOTE — ASSESSMENT & PLAN NOTE
Start Fluoxetine 20 mg daily. The risks and benefits of my recommendations, as well as other treatment options were discussed with the patient today.    The side effects of the medications were discussed.  Advised not to take the medication with alcohol .  Exercise regularly and this can give you a sense of well being and help decrease feelings of anxiety.;  Get plenty of sleep. Sleep rests your brain as well as your body, and can improve your general sense of wellbeing as well as your mood.;  Avoid alcohol and drug abuse. It may seem that alcohol or drugs relax you. But in the long run they make anxiety worse and cause more problems.;  Avoid caffeine. Caffeine is found in coffee, tea, soft drinks and chocolate. Caffeine may increase your sense of anxiety because it stimulates your nervous system. Also avoid over-the-counter diet pills, and cough and cold medicines that contain a decongestant.;  Confront the things that have made you anxious in the past. Begin by just picturing yourself confronting these things. By doing this, you can get used to the idea of confronting the things that make you anxious before you actually do it. After you feel more comfortable picturing yourself confronting these things, you can begin to actually face them.;  If you feel yourself getting anxious, practice a relaxation technique or focus on a simple task, such as counting backward from 100 to 0.;  Although feelings of anxiety are scary, they won't hurt you. Label the level of your fear from 0 to 10 and keep track as it goes up and down. Notice that it doesn't stay at a very high level for more than a few seconds. When the fear comes, accept it. Wait and give it time to pass without running away from it.;  Take medications as prescribed.

## 2024-08-29 NOTE — PROGRESS NOTES
Subjective   Patient ID: Shahida Mason is a 39 y.o. female who presents for Annual Exam, Follow-up (Migraines), and Anxiety.    Patient presents today for annual physical exam.    Migraine   This is a chronic problem. The current episode started more than 1 year ago. Episode frequency: twice a month for several days in duration. The problem has been gradually worsening. The pain is located in the Bilateral region. The pain does not radiate. The pain quality is similar to prior headaches. The quality of the pain is described as aching. Associated symptoms include dizziness and nausea. Pertinent negatives include no abdominal pain, blurred vision, coughing, ear pain, fever, insomnia, loss of balance, neck pain, numbness, phonophobia, photophobia, rhinorrhea, seizures, sore throat, tinnitus, visual change, vomiting or weakness. The symptoms are aggravated by menstrual cycle and emotional stress. She has tried triptans for the symptoms. The treatment provided moderate relief.   Anxiety  Presents for initial visit. Episode onset: 2 to 3 months ago. Symptoms include chest pain, dizziness, excessive worry, irritability, muscle tension, nausea, nervous/anxious behavior, restlessness and shortness of breath. Patient reports no hyperventilation, insomnia, palpitations or suicidal ideas.          Review of Systems   Constitutional:  Positive for irritability. Negative for chills and fever.   HENT:  Negative for congestion, ear pain, nosebleeds, rhinorrhea, sore throat and tinnitus.    Eyes:  Negative for blurred vision and photophobia.   Respiratory:  Positive for shortness of breath. Negative for cough and wheezing.    Cardiovascular:  Positive for chest pain. Negative for palpitations and leg swelling.   Gastrointestinal:  Positive for nausea. Negative for abdominal pain, constipation, diarrhea and vomiting.   Genitourinary:  Negative for dysuria, frequency and hematuria.   Musculoskeletal:  Negative for neck pain.  "  Neurological:  Positive for dizziness. Negative for tremors, seizures, weakness, numbness, headaches and loss of balance.   Psychiatric/Behavioral:  Negative for suicidal ideas. The patient is nervous/anxious. The patient does not have insomnia.        Objective   /62   Pulse 80   Temp 36.7 °C (98 °F)   Resp 16   Ht 1.651 m (5' 5\")   Wt 59 kg (130 lb)   BMI 21.63 kg/m²     Physical Exam  Constitutional:       General: She is not in acute distress.     Appearance: Normal appearance.   HENT:      Head: Normocephalic and atraumatic.      Mouth/Throat:      Mouth: Mucous membranes are moist.      Pharynx: Oropharynx is clear. No oropharyngeal exudate or posterior oropharyngeal erythema.   Eyes:      General: No scleral icterus.     Extraocular Movements: Extraocular movements intact.      Pupils: Pupils are equal, round, and reactive to light.   Cardiovascular:      Rate and Rhythm: Normal rate and regular rhythm.      Pulses: Normal pulses.      Heart sounds: No murmur heard.     No friction rub. No gallop.   Pulmonary:      Effort: Pulmonary effort is normal.      Breath sounds: No wheezing, rhonchi or rales.   Skin:     General: Skin is warm.      Coloration: Skin is not jaundiced or pale.      Findings: No erythema or rash.   Neurological:      General: No focal deficit present.      Mental Status: She is alert and oriented to person, place, and time.      Cranial Nerves: No cranial nerve deficit.      Sensory: No sensory deficit.      Coordination: Coordination normal.      Gait: Gait normal.         Assessment/Plan   Problem List Items Addressed This Visit       Generalized anxiety disorder     Start Fluoxetine 20 mg daily. The risks and benefits of my recommendations, as well as other treatment options were discussed with the patient today.    The side effects of the medications were discussed.  Advised not to take the medication with alcohol .  Exercise regularly and this can give you a sense of " well being and help decrease feelings of anxiety.;  Get plenty of sleep. Sleep rests your brain as well as your body, and can improve your general sense of wellbeing as well as your mood.;  Avoid alcohol and drug abuse. It may seem that alcohol or drugs relax you. But in the long run they make anxiety worse and cause more problems.;  Avoid caffeine. Caffeine is found in coffee, tea, soft drinks and chocolate. Caffeine may increase your sense of anxiety because it stimulates your nervous system. Also avoid over-the-counter diet pills, and cough and cold medicines that contain a decongestant.;  Confront the things that have made you anxious in the past. Begin by just picturing yourself confronting these things. By doing this, you can get used to the idea of confronting the things that make you anxious before you actually do it. After you feel more comfortable picturing yourself confronting these things, you can begin to actually face them.;  If you feel yourself getting anxious, practice a relaxation technique or focus on a simple task, such as counting backward from 100 to 0.;  Although feelings of anxiety are scary, they won't hurt you. Label the level of your fear from 0 to 10 and keep track as it goes up and down. Notice that it doesn't stay at a very high level for more than a few seconds. When the fear comes, accept it. Wait and give it time to pass without running away from it.;  Take medications as prescribed.         Relevant Medications    FLUoxetine (PROzac) 20 mg capsule    Other Relevant Orders    Follow Up In [facility] - PCP - Established    Healthcare maintenance - Primary     Recommend low-cholesterol diet, low-fat diet and low-salt diet.  The need for lifelong dietary compliance in order to reduce cardiac risk is recommended.  We will also recommend regular exercise program to improve lipid balance and overall health.  Recommend decreasing fat and cholesterol in diet, increasing aerobic  exercise with a goal of 4 or more days per week         Relevant Orders    CBC and Auto Differential    Comprehensive Metabolic Panel    Lipid Panel    Intractable migraine without aura and without status migrainosus     Continue current medications and management.         Relevant Medications    topiramate (Topamax) 50 mg tablet    SUMAtriptan (Imitrex) 100 mg tablet    PMDD (premenstrual dysphoric disorder)     Start Fluoxetine 20 mg daily. The risks and benefits of my recommendations, as well as other treatment options were discussed with the patient today.          Relevant Medications    FLUoxetine (PROzac) 20 mg capsule    Other Relevant Orders    Follow Up In [facility] - PCP - Established     Scribe Attestation  By signing my name below, I, Winifred Hu   attest that this documentation has been prepared under the direction and in the presence of Denis Man MD.

## 2024-10-20 DIAGNOSIS — F32.81 PMDD (PREMENSTRUAL DYSPHORIC DISORDER): ICD-10-CM

## 2024-10-20 DIAGNOSIS — F41.1 GENERALIZED ANXIETY DISORDER: ICD-10-CM

## 2024-10-20 RX ORDER — FLUOXETINE HYDROCHLORIDE 20 MG/1
20 CAPSULE ORAL DAILY
Qty: 30 CAPSULE | Refills: 2 | OUTPATIENT
Start: 2024-10-20

## 2024-10-25 DIAGNOSIS — F41.1 GENERALIZED ANXIETY DISORDER: ICD-10-CM

## 2024-10-25 DIAGNOSIS — F32.81 PMDD (PREMENSTRUAL DYSPHORIC DISORDER): ICD-10-CM

## 2024-10-26 RX ORDER — FLUOXETINE HYDROCHLORIDE 20 MG/1
20 CAPSULE ORAL DAILY
Qty: 30 CAPSULE | Refills: 2 | OUTPATIENT
Start: 2024-10-26

## 2024-10-30 ENCOUNTER — APPOINTMENT (OUTPATIENT)
Dept: PRIMARY CARE | Facility: CLINIC | Age: 39
End: 2024-10-30
Payer: COMMERCIAL

## 2024-10-30 VITALS
SYSTOLIC BLOOD PRESSURE: 118 MMHG | DIASTOLIC BLOOD PRESSURE: 62 MMHG | HEART RATE: 62 BPM | TEMPERATURE: 96 F | HEIGHT: 65 IN | WEIGHT: 128 LBS | RESPIRATION RATE: 19 BRPM | BODY MASS INDEX: 21.33 KG/M2 | OXYGEN SATURATION: 100 %

## 2024-10-30 DIAGNOSIS — G43.019 INTRACTABLE MIGRAINE WITHOUT AURA AND WITHOUT STATUS MIGRAINOSUS: ICD-10-CM

## 2024-10-30 DIAGNOSIS — F41.1 GENERALIZED ANXIETY DISORDER: Primary | ICD-10-CM

## 2024-10-30 DIAGNOSIS — F32.81 PMDD (PREMENSTRUAL DYSPHORIC DISORDER): ICD-10-CM

## 2024-10-30 PROCEDURE — 3008F BODY MASS INDEX DOCD: CPT | Performed by: FAMILY MEDICINE

## 2024-10-30 PROCEDURE — 99213 OFFICE O/P EST LOW 20 MIN: CPT | Performed by: FAMILY MEDICINE

## 2024-10-30 PROCEDURE — 1036F TOBACCO NON-USER: CPT | Performed by: FAMILY MEDICINE

## 2024-10-30 RX ORDER — FLUOXETINE HYDROCHLORIDE 20 MG/1
20 CAPSULE ORAL DAILY
Qty: 90 CAPSULE | Refills: 1 | Status: SHIPPED | OUTPATIENT
Start: 2024-10-30

## 2024-10-30 ASSESSMENT — ENCOUNTER SYMPTOMS
NERVOUS/ANXIOUS: 0
NUMBNESS: 0
DIARRHEA: 0
DECREASED CONCENTRATION: 0
ABDOMINAL PAIN: 0
HEADACHES: 0
IRRITABILITY: 0
WHEEZING: 0
SHORTNESS OF BREATH: 0
TINGLING: 0
COUGH: 0
INSOMNIA: 0
BLURRED VISION: 0
RESTLESSNESS: 0
FREQUENCY: 0
WEAKNESS: 0
PALPITATIONS: 0
TREMORS: 0
CONSTIPATION: 0
FEVER: 0
SORE THROAT: 0
DIZZINESS: 0
HEMATURIA: 0
RHINORRHEA: 0
CHILLS: 0
DEPRESSED MOOD: 0
LOSS OF BALANCE: 0
VOMITING: 0
DYSURIA: 0

## 2024-10-30 ASSESSMENT — PATIENT HEALTH QUESTIONNAIRE - PHQ9
1. LITTLE INTEREST OR PLEASURE IN DOING THINGS: NOT AT ALL
2. FEELING DOWN, DEPRESSED OR HOPELESS: NOT AT ALL
SUM OF ALL RESPONSES TO PHQ9 QUESTIONS 1 AND 2: 0

## 2024-10-30 ASSESSMENT — ANXIETY QUESTIONNAIRES
4. TROUBLE RELAXING: NOT AT ALL
GAD7 TOTAL SCORE: 0
2. NOT BEING ABLE TO STOP OR CONTROL WORRYING: NOT AT ALL
6. BECOMING EASILY ANNOYED OR IRRITABLE: NOT AT ALL
1. FEELING NERVOUS, ANXIOUS, OR ON EDGE: NOT AT ALL
5. BEING SO RESTLESS THAT IT IS HARD TO SIT STILL: NOT AT ALL
3. WORRYING TOO MUCH ABOUT DIFFERENT THINGS: NOT AT ALL
IF YOU CHECKED OFF ANY PROBLEMS ON THIS QUESTIONNAIRE, HOW DIFFICULT HAVE THESE PROBLEMS MADE IT FOR YOU TO DO YOUR WORK, TAKE CARE OF THINGS AT HOME, OR GET ALONG WITH OTHER PEOPLE: NOT DIFFICULT AT ALL
7. FEELING AFRAID AS IF SOMETHING AWFUL MIGHT HAPPEN: NOT AT ALL

## 2024-12-24 DIAGNOSIS — G43.019 INTRACTABLE MIGRAINE WITHOUT AURA AND WITHOUT STATUS MIGRAINOSUS: ICD-10-CM

## 2024-12-24 RX ORDER — TOPIRAMATE 50 MG/1
TABLET, FILM COATED ORAL
Qty: 75 TABLET | Refills: 5 | OUTPATIENT
Start: 2024-12-24

## 2024-12-24 NOTE — TELEPHONE ENCOUNTER
Last disp. 10/30/2024  Last Rx 2024  w/5 rf   Name: Shahida DARIEL Mason  :  1985     Date of last appointment:  10/30/2024   Date of next appointment:  3/11/2025   Best number to reach patient:  [phone]

## 2025-02-19 DIAGNOSIS — R06.2 WHEEZING: ICD-10-CM

## 2025-02-19 RX ORDER — ALBUTEROL SULFATE 90 UG/1
INHALANT RESPIRATORY (INHALATION)
Qty: 18 G | Refills: 5 | Status: SHIPPED | OUTPATIENT
Start: 2025-02-19

## 2025-02-19 NOTE — TELEPHONE ENCOUNTER
Rx Refill Request     Name: Shahida Mason  :  1985   Medication Name:  albuterol 90 mcg/actuation inhale   Specific Pharmacy location:  [id number] - [place]   Date of last appointment:  10/30/2024   Date of next appointment:  3/11/2025   Best number to reach patient:  [phone]

## 2025-03-11 ENCOUNTER — APPOINTMENT (OUTPATIENT)
Dept: PRIMARY CARE | Facility: CLINIC | Age: 40
End: 2025-03-11
Payer: COMMERCIAL

## 2025-03-17 DIAGNOSIS — F41.1 GENERALIZED ANXIETY DISORDER: ICD-10-CM

## 2025-03-17 DIAGNOSIS — F32.81 PMDD (PREMENSTRUAL DYSPHORIC DISORDER): ICD-10-CM

## 2025-03-17 RX ORDER — FLUOXETINE HYDROCHLORIDE 20 MG/1
20 CAPSULE ORAL DAILY
Qty: 30 CAPSULE | Refills: 0 | Status: SHIPPED | OUTPATIENT
Start: 2025-03-17

## 2025-03-17 NOTE — TELEPHONE ENCOUNTER
Rx Refill Request -- 30 days pended --    Name: Shahida DARIEL Mason  :  1985     Date of last appointment:  10/30/2024   Date of next appointment:  3/24/2025   Best number to reach patient:  [phone]

## 2025-03-24 ENCOUNTER — APPOINTMENT (OUTPATIENT)
Dept: PRIMARY CARE | Facility: CLINIC | Age: 40
End: 2025-03-24
Payer: COMMERCIAL

## 2025-03-24 VITALS
HEIGHT: 65 IN | OXYGEN SATURATION: 98 % | DIASTOLIC BLOOD PRESSURE: 62 MMHG | BODY MASS INDEX: 23.26 KG/M2 | RESPIRATION RATE: 16 BRPM | WEIGHT: 139.6 LBS | TEMPERATURE: 97.8 F | HEART RATE: 62 BPM | SYSTOLIC BLOOD PRESSURE: 116 MMHG

## 2025-03-24 DIAGNOSIS — F41.1 GENERALIZED ANXIETY DISORDER: ICD-10-CM

## 2025-03-24 DIAGNOSIS — Z00.00 HEALTHCARE MAINTENANCE: ICD-10-CM

## 2025-03-24 DIAGNOSIS — G43.019 INTRACTABLE MIGRAINE WITHOUT AURA AND WITHOUT STATUS MIGRAINOSUS: ICD-10-CM

## 2025-03-24 DIAGNOSIS — F32.81 PMDD (PREMENSTRUAL DYSPHORIC DISORDER): ICD-10-CM

## 2025-03-24 PROCEDURE — 99214 OFFICE O/P EST MOD 30 MIN: CPT | Performed by: FAMILY MEDICINE

## 2025-03-24 PROCEDURE — 1036F TOBACCO NON-USER: CPT | Performed by: FAMILY MEDICINE

## 2025-03-24 PROCEDURE — 3008F BODY MASS INDEX DOCD: CPT | Performed by: FAMILY MEDICINE

## 2025-03-24 RX ORDER — SUMATRIPTAN SUCCINATE 100 MG/1
TABLET ORAL
Qty: 10 TABLET | Refills: 5 | Status: SHIPPED | OUTPATIENT
Start: 2025-03-24

## 2025-03-24 RX ORDER — TOPIRAMATE 50 MG/1
TABLET, FILM COATED ORAL
Qty: 75 TABLET | Refills: 5 | Status: SHIPPED | OUTPATIENT
Start: 2025-03-24 | End: 2025-03-24 | Stop reason: SDUPTHER

## 2025-03-24 RX ORDER — TOPIRAMATE 50 MG/1
TABLET, FILM COATED ORAL
Qty: 225 TABLET | Refills: 1 | Status: SHIPPED | OUTPATIENT
Start: 2025-03-24

## 2025-03-24 RX ORDER — FLUOXETINE HYDROCHLORIDE 20 MG/1
20 CAPSULE ORAL DAILY
Qty: 90 CAPSULE | Refills: 1 | Status: SHIPPED | OUTPATIENT
Start: 2025-03-24

## 2025-03-24 ASSESSMENT — PATIENT HEALTH QUESTIONNAIRE - PHQ9
SUM OF ALL RESPONSES TO PHQ9 QUESTIONS 1 AND 2: 0
2. FEELING DOWN, DEPRESSED OR HOPELESS: NOT AT ALL
1. LITTLE INTEREST OR PLEASURE IN DOING THINGS: NOT AT ALL

## 2025-03-24 ASSESSMENT — ANXIETY QUESTIONNAIRES
7. FEELING AFRAID AS IF SOMETHING AWFUL MIGHT HAPPEN: NOT AT ALL
GAD7 TOTAL SCORE: 0
IF YOU CHECKED OFF ANY PROBLEMS ON THIS QUESTIONNAIRE, HOW DIFFICULT HAVE THESE PROBLEMS MADE IT FOR YOU TO DO YOUR WORK, TAKE CARE OF THINGS AT HOME, OR GET ALONG WITH OTHER PEOPLE: NOT DIFFICULT AT ALL
2. NOT BEING ABLE TO STOP OR CONTROL WORRYING: NOT AT ALL
6. BECOMING EASILY ANNOYED OR IRRITABLE: NOT AT ALL
4. TROUBLE RELAXING: NOT AT ALL
1. FEELING NERVOUS, ANXIOUS, OR ON EDGE: NOT AT ALL
5. BEING SO RESTLESS THAT IT IS HARD TO SIT STILL: NOT AT ALL
3. WORRYING TOO MUCH ABOUT DIFFERENT THINGS: NOT AT ALL

## 2025-03-24 ASSESSMENT — ENCOUNTER SYMPTOMS
IRRITABILITY: 0
RHINORRHEA: 0
WHEEZING: 0
FREQUENCY: 0
VOMITING: 0
DYSURIA: 0
RESTLESSNESS: 0
CONSTIPATION: 0
PANIC: 0
DIARRHEA: 0
ABDOMINAL PAIN: 0
DIZZINESS: 0
CHILLS: 0
HEMATURIA: 0
NERVOUS/ANXIOUS: 0
HEADACHES: 0
DEPRESSED MOOD: 0
SHORTNESS OF BREATH: 0
PALPITATIONS: 0
COUGH: 0
SORE THROAT: 0
NUMBNESS: 0
TREMORS: 0
FEVER: 0

## 2025-03-24 NOTE — ASSESSMENT & PLAN NOTE
Well-controlled, continue current medications and management.   The risks and benefits of my recommendations, as well as other treatment options were discussed with the patient today.    The side effects of the medications were discussed.  Advised not to take the medication with alcohol .  Exercise regularly and this can give you a sense of well being and help decrease feelings of anxiety.;  Get plenty of sleep. Sleep rests your brain as well as your body, and can improve your general sense of wellbeing as well as your mood.;  Avoid alcohol and drug abuse. It may seem that alcohol or drugs relax you. But in the long run they make anxiety worse and cause more problems.;  Avoid caffeine. Caffeine is found in coffee, tea, soft drinks and chocolate. Caffeine may increase your sense of anxiety because it stimulates your nervous system. Also avoid over-the-counter diet pills, and cough and cold medicines that contain a decongestant.;  Confront the things that have made you anxious in the past. Begin by just picturing yourself confronting these things. By doing this, you can get used to the idea of confronting the things that make you anxious before you actually do it. After you feel more comfortable picturing yourself confronting these things, you can begin to actually face them.;  If you feel yourself getting anxious, practice a relaxation technique or focus on a simple task, such as counting backward from 100 to 0.;  Although feelings of anxiety are scary, they won't hurt you. Label the level of your fear from 0 to 10 and keep track as it goes up and down. Notice that it doesn't stay at a very high level for more than a few seconds. When the fear comes, accept it. Wait and give it time to pass without running away from it.;  Take medications as prescribed.

## 2025-03-24 NOTE — PROGRESS NOTES
"Subjective   Patient ID: Shahida Mason is a 39 y.o. female who presents for Follow-up (Anxiety, PMDD, Migraines ).    She is also following up on PMDD  for which she has been on FLUoxetine (PROzac). She has been taking the medication regularly with control of her symptoms. She reports no side effects from the medication     She is also following up on migraines for which she has been on topiramate and sumatriptan.  She has been taking the medication regularly with control of her symptoms. She reports no side effects from the medication.           Anxiety  Presents for follow-up visit. Patient reports no chest pain, depressed mood, dizziness, excessive worry, irritability, nervous/anxious behavior, palpitations, panic, restlessness, shortness of breath or suicidal ideas. Symptoms occur rarely. The severity of symptoms is mild. The patient sleeps 8 hours per night. The quality of sleep is good. Nighttime awakenings: one to two.     Compliance with medications is %.        Review of Systems   Constitutional:  Negative for chills, fever and irritability.   HENT:  Negative for congestion, ear pain, nosebleeds, rhinorrhea and sore throat.    Respiratory:  Negative for cough, shortness of breath and wheezing.    Cardiovascular:  Negative for chest pain, palpitations and leg swelling.   Gastrointestinal:  Negative for abdominal pain, constipation, diarrhea and vomiting.   Genitourinary:  Negative for dysuria, frequency and hematuria.   Neurological:  Negative for dizziness, tremors, numbness and headaches.   Psychiatric/Behavioral:  Negative for suicidal ideas. The patient is not nervous/anxious.        Objective   /62 (BP Location: Left arm, Patient Position: Sitting)   Pulse 62   Temp 36.6 °C (97.8 °F)   Resp 16   Ht 1.651 m (5' 5\")   Wt 63.3 kg (139 lb 9.6 oz)   SpO2 98%   BMI 23.23 kg/m²     Physical Exam  Constitutional:       General: She is not in acute distress.     Appearance: Normal " appearance.   HENT:      Head: Normocephalic and atraumatic.      Mouth/Throat:      Mouth: Mucous membranes are moist.      Pharynx: Oropharynx is clear. No oropharyngeal exudate or posterior oropharyngeal erythema.   Eyes:      General: No scleral icterus.     Extraocular Movements: Extraocular movements intact.      Pupils: Pupils are equal, round, and reactive to light.   Cardiovascular:      Rate and Rhythm: Normal rate and regular rhythm.      Pulses: Normal pulses.      Heart sounds: No murmur heard.     No friction rub. No gallop.   Pulmonary:      Effort: Pulmonary effort is normal.      Breath sounds: No wheezing, rhonchi or rales.   Skin:     General: Skin is warm.      Coloration: Skin is not jaundiced or pale.      Findings: No erythema or rash.   Neurological:      General: No focal deficit present.      Mental Status: She is alert and oriented to person, place, and time.      Cranial Nerves: No cranial nerve deficit.      Sensory: No sensory deficit.      Coordination: Coordination normal.      Gait: Gait normal.         Assessment/Plan   Problem List Items Addressed This Visit       Generalized anxiety disorder     Well-controlled, continue current medications and management.   The risks and benefits of my recommendations, as well as other treatment options were discussed with the patient today.    The side effects of the medications were discussed.  Advised not to take the medication with alcohol .  Exercise regularly and this can give you a sense of well being and help decrease feelings of anxiety.;  Get plenty of sleep. Sleep rests your brain as well as your body, and can improve your general sense of wellbeing as well as your mood.;  Avoid alcohol and drug abuse. It may seem that alcohol or drugs relax you. But in the long run they make anxiety worse and cause more problems.;  Avoid caffeine. Caffeine is found in coffee, tea, soft drinks and chocolate. Caffeine may increase your sense of anxiety  because it stimulates your nervous system. Also avoid over-the-counter diet pills, and cough and cold medicines that contain a decongestant.;  Confront the things that have made you anxious in the past. Begin by just picturing yourself confronting these things. By doing this, you can get used to the idea of confronting the things that make you anxious before you actually do it. After you feel more comfortable picturing yourself confronting these things, you can begin to actually face them.;  If you feel yourself getting anxious, practice a relaxation technique or focus on a simple task, such as counting backward from 100 to 0.;  Although feelings of anxiety are scary, they won't hurt you. Label the level of your fear from 0 to 10 and keep track as it goes up and down. Notice that it doesn't stay at a very high level for more than a few seconds. When the fear comes, accept it. Wait and give it time to pass without running away from it.;  Take medications as prescribed.           Relevant Medications    FLUoxetine (PROzac) 20 mg capsule    Healthcare maintenance     Orders have been placed for fasting CBC with diff, Comprehensive metabolic panel, and Lipid to be drawn in  5 month  as ordered for further evaluation.  Draw labs prior to physical.          Relevant Orders    CBC and Auto Differential    Comprehensive Metabolic Panel    Lipid Panel    Follow Up In [facility] - PCP - Established    Intractable migraine without aura and without status migrainosus     Well-controlled, continue current medications and management.           Relevant Medications    SUMAtriptan (Imitrex) 100 mg tablet    topiramate (Topamax) 50 mg tablet    PMDD (premenstrual dysphoric disorder)     Well-controlled, continue current medications and management.           Relevant Medications    FLUoxetine (PROzac) 20 mg capsule

## 2025-03-24 NOTE — ASSESSMENT & PLAN NOTE
Orders have been placed for fasting CBC with diff, Comprehensive metabolic panel, and Lipid to be drawn in  5 month  as ordered for further evaluation.  Draw labs prior to physical.

## 2025-08-24 DIAGNOSIS — Z00.00 HEALTHCARE MAINTENANCE: ICD-10-CM

## 2025-08-26 ENCOUNTER — APPOINTMENT (OUTPATIENT)
Dept: PRIMARY CARE | Facility: CLINIC | Age: 40
End: 2025-08-26
Payer: COMMERCIAL

## 2025-08-26 VITALS
OXYGEN SATURATION: 97 % | RESPIRATION RATE: 16 BRPM | SYSTOLIC BLOOD PRESSURE: 108 MMHG | DIASTOLIC BLOOD PRESSURE: 70 MMHG | WEIGHT: 139 LBS | BODY MASS INDEX: 23.16 KG/M2 | HEIGHT: 65 IN | TEMPERATURE: 97.2 F

## 2025-08-26 DIAGNOSIS — F41.1 GENERALIZED ANXIETY DISORDER: ICD-10-CM

## 2025-08-26 DIAGNOSIS — G43.019 INTRACTABLE MIGRAINE WITHOUT AURA AND WITHOUT STATUS MIGRAINOSUS: ICD-10-CM

## 2025-08-26 DIAGNOSIS — F32.81 PMDD (PREMENSTRUAL DYSPHORIC DISORDER): ICD-10-CM

## 2025-08-26 DIAGNOSIS — Z00.00 HEALTHCARE MAINTENANCE: Primary | ICD-10-CM

## 2025-08-26 DIAGNOSIS — J45.20 MILD INTERMITTENT ASTHMA WITHOUT COMPLICATION (HHS-HCC): ICD-10-CM

## 2025-08-26 DIAGNOSIS — Z12.31 ENCOUNTER FOR SCREENING MAMMOGRAM FOR BREAST CANCER: ICD-10-CM

## 2025-08-26 PROCEDURE — 1036F TOBACCO NON-USER: CPT | Performed by: FAMILY MEDICINE

## 2025-08-26 PROCEDURE — 3008F BODY MASS INDEX DOCD: CPT | Performed by: FAMILY MEDICINE

## 2025-08-26 PROCEDURE — 99396 PREV VISIT EST AGE 40-64: CPT | Performed by: FAMILY MEDICINE

## 2025-08-26 RX ORDER — FLUOXETINE 20 MG/1
20 CAPSULE ORAL DAILY
Qty: 90 CAPSULE | Refills: 1 | Status: SHIPPED | OUTPATIENT
Start: 2025-08-26

## 2025-08-26 RX ORDER — SUMATRIPTAN SUCCINATE 100 MG/1
TABLET ORAL
Qty: 10 TABLET | Refills: 5 | Status: SHIPPED | OUTPATIENT
Start: 2025-08-26

## 2025-08-26 RX ORDER — TOPIRAMATE 50 MG/1
TABLET, FILM COATED ORAL
Qty: 225 TABLET | Refills: 1 | Status: SHIPPED | OUTPATIENT
Start: 2025-08-26

## 2025-08-26 ASSESSMENT — ENCOUNTER SYMPTOMS
THOUGHT CONTENT - OBSESSIONS: 0
HEMATURIA: 0
TINGLING: 0
ADENOPATHY: 0
PALPITATIONS: 0
DECREASED CONCENTRATION: 0
FEVER: 0
WHEEZING: 0
NUMBNESS: 0
LOSS OF BALANCE: 0
SORE THROAT: 0
VOMITING: 0
SPEECH DIFFICULTY: 0
COUGH: 0
MALAISE: 0
NERVOUS/ANXIOUS: 0
WEAKNESS: 0
FREQUENCY: 0
BRUISES/BLEEDS EASILY: 0
CHILLS: 0
HYPERVENTILATION: 0
TREMORS: 0
RESTLESSNESS: 0
CONFUSION: 0
ABDOMINAL PAIN: 0
DIZZINESS: 0
IRRITABILITY: 0
DIARRHEA: 0
DEPRESSED MOOD: 0
SHORTNESS OF BREATH: 0
FEELING OF CHOKING: 0
COMPULSIONS: 0
DYSURIA: 0
PANIC: 0
RHINORRHEA: 0
CONSTIPATION: 0
POLYDIPSIA: 0
POLYPHAGIA: 0

## 2025-08-26 ASSESSMENT — ANXIETY QUESTIONNAIRES
5. BEING SO RESTLESS THAT IT IS HARD TO SIT STILL: NOT AT ALL
IF YOU CHECKED OFF ANY PROBLEMS ON THIS QUESTIONNAIRE, HOW DIFFICULT HAVE THESE PROBLEMS MADE IT FOR YOU TO DO YOUR WORK, TAKE CARE OF THINGS AT HOME, OR GET ALONG WITH OTHER PEOPLE: NOT DIFFICULT AT ALL
6. BECOMING EASILY ANNOYED OR IRRITABLE: NOT AT ALL
3. WORRYING TOO MUCH ABOUT DIFFERENT THINGS: NOT AT ALL
4. TROUBLE RELAXING: NOT AT ALL
1. FEELING NERVOUS, ANXIOUS, OR ON EDGE: NOT AT ALL
GAD7 TOTAL SCORE: 0
2. NOT BEING ABLE TO STOP OR CONTROL WORRYING: NOT AT ALL
7. FEELING AFRAID AS IF SOMETHING AWFUL MIGHT HAPPEN: NOT AT ALL

## 2025-11-26 ENCOUNTER — APPOINTMENT (OUTPATIENT)
Dept: PRIMARY CARE | Facility: CLINIC | Age: 40
End: 2025-11-26
Payer: COMMERCIAL